# Patient Record
Sex: FEMALE
[De-identification: names, ages, dates, MRNs, and addresses within clinical notes are randomized per-mention and may not be internally consistent; named-entity substitution may affect disease eponyms.]

---

## 2021-04-16 PROBLEM — Z00.00 ENCOUNTER FOR PREVENTIVE HEALTH EXAMINATION: Status: ACTIVE | Noted: 2021-04-16

## 2021-04-20 ENCOUNTER — APPOINTMENT (OUTPATIENT)
Dept: NEUROSURGERY | Facility: CLINIC | Age: 76
End: 2021-04-20
Payer: MEDICARE

## 2021-04-20 VITALS
TEMPERATURE: 98.3 F | BODY MASS INDEX: 39.34 KG/M2 | SYSTOLIC BLOOD PRESSURE: 209 MMHG | OXYGEN SATURATION: 97 % | HEART RATE: 89 BPM | HEIGHT: 55 IN | WEIGHT: 170 LBS | RESPIRATION RATE: 17 BRPM | DIASTOLIC BLOOD PRESSURE: 87 MMHG

## 2021-04-20 DIAGNOSIS — I10 ESSENTIAL (PRIMARY) HYPERTENSION: ICD-10-CM

## 2021-04-20 DIAGNOSIS — J45.909 UNSPECIFIED ASTHMA, UNCOMPLICATED: ICD-10-CM

## 2021-04-20 DIAGNOSIS — G95.9 DISEASE OF SPINAL CORD, UNSPECIFIED: ICD-10-CM

## 2021-04-20 DIAGNOSIS — E11.9 TYPE 2 DIABETES MELLITUS W/OUT COMPLICATIONS: ICD-10-CM

## 2021-04-20 DIAGNOSIS — Z78.9 OTHER SPECIFIED HEALTH STATUS: ICD-10-CM

## 2021-04-20 PROCEDURE — 99205 OFFICE O/P NEW HI 60 MIN: CPT | Mod: 57

## 2021-04-20 NOTE — CONSULT LETTER
[Dear  ___] : Dear  [unfilled], [Consult Letter:] : I had the pleasure of evaluating your patient, [unfilled]. [Please see my note below.] : Please see my note below. [Consult Closing:] : Thank you very much for allowing me to participate in the care of this patient.  If you have any questions, please do not hesitate to contact me. [Sincerely,] : Sincerely, [FreeTextEntry2] : Patrick Mujica MD\par [address]\[place] [address]\par  [place] [phone] [FreeTextEntry3] : El Henao MD, FACS, FAANS\par Professor and \par Department of Neurosurgery\par [facility]\par [address] [place] [address], [address] [place] [address]\[place] [address]\par [phone] Clinical\par [phone] Academic\par

## 2021-04-20 NOTE — DATA REVIEWED
[de-identified] : MRI cervical spine 3/26/21 shows classical multilevel cervical spondylosis with severe canal stenosis at C3-4, 4-5, 5-6 and less at C6-7. 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

## 2021-04-20 NOTE — HISTORY OF PRESENT ILLNESS
[Other: ___] : [unfilled] [FreeTextEntry1] : Weakness of the arms and legs worse on left, left arm pain and low back pain with bilateral leg radiation since October 2020 [de-identified] : \par Leni Huerta is a 76 year old RH lady of [place] origin, who is here for consultation regarding leg weakness and left arm pain. In the year 2006 pt experienced  knee pain and leg weakness and was wheel chair bound. She was treated with nerve blocks and trigger point injections. Her leg weakness got better over 2 months with therapy and she progressed from walker to cane and eventually was able to walk without any support. She was doing well until in October 2020, when she again started to experience leg pains and weakness in both legs. The pain and weakness started suddenly, she fell and has not been able to walk after that.  Her pain was managed by Patrick Mujica MD, who referred her here for evaluation of myelomalacia C3- C5 on a cervical MRI. \par \par  Today she presents to the clinic in a wheel chair with c/o inability to walk and low back pain which radiates down both legs left more than right. She also reports occasional neck pain and left arm pain. The  pain in the back and legs is sharp and can go up to 10/10 and temporarily relieved by Tylenol. She feels weakness in both arms and legs, is unable to walk  and has been wheel chair bound since October 2020. Her main problem is the weakness in her legs. She has no acute bladder or bowel incontinence or anesthesia. \par \par Pain management: Patrick Mujica MD, [address] [place] [address] : Phone- [phone]\par  \par \par  \par

## 2021-04-20 NOTE — PHYSICAL EXAM
[General Appearance - Alert] : alert [General Appearance - In No Acute Distress] : in no acute distress [General Appearance - Well Nourished] : well nourished [General Appearance - Well-Appearing] : healthy appearing [Oriented To Time, Place, And Person] : oriented to person, place, and time [Affect] : the affect was normal [Person] : oriented to person [Place] : oriented to place [Time] : oriented to time [Cranial Nerves Optic (II)] : visual acuity intact bilaterally,  pupils equal round and reactive to light [Cranial Nerves Oculomotor (III)] : extraocular motion intact [Cranial Nerves Trigeminal (V)] : facial sensation intact symmetrically [Cranial Nerves Facial (VII)] : face symmetrical [Cranial Nerves Vestibulocochlear (VIII)] : hearing was intact bilaterally [Cranial Nerves Accessory (XI - Cranial And Spinal)] : head turning and shoulder shrug symmetric [5] : C5 Biceps 5/5 [2] : C7 extensor digitorum longus 2/5 [3] : S1 flexor hallucis longus 3/5 [Jameson] : Jameson's sign was demonstrated [Sclera] : the sclera and conjunctiva were normal [PERRL With Normal Accommodation] : pupils were equal in size, round, reactive to light, with normal accommodation [Outer Ear] : the ears and nose were normal in appearance [Neck Appearance] : the appearance of the neck was normal [] : the neck was supple [Respiration, Rhythm And Depth] : normal respiratory rhythm and effort [Apical Impulse] : the apical impulse was normal [Heart Rate And Rhythm] : heart rate was normal and rhythm regular [Arterial Pulses Carotid] : carotid pulses were normal with no bruits [Abdominal Aorta] : the abdominal aorta was normal [No CVA Tenderness] : no ~M costovertebral angle tenderness [Involuntary Movements] : no involuntary movements were seen [Cranial Nerves Glossopharyngeal (IX)] : tongue and palate midline [Cranial Nerves Hypoglossal (XII)] : there was no tongue deviation with protrusion [Quadriparesis] : quadriparesis affecting all four limbs [4] : L2 Iliopsoas 4/5 [Sensation Tactile Decrease] : light touch was intact [Sensation Pain / Temperature Decrease] : pain and temperature was intact [3+] : Triceps left 3+ [2+] : Brachioradialis left 2+ [FreeTextEntry1] : Tuvaluan speaking.  [FreeTextEntry6] : Wheelchair bound. Cannot stand on her own. [FreeTextEntry8] : unable to walk [de-identified] : Left Jameson

## 2021-04-20 NOTE — ASSESSMENT
[FreeTextEntry1] : IMPRESSION:\par 1. Severe cervical canal stenosis C3-7 with myelomalacia C3-6\par 2. Functional quadriparesis\par 3. Myelopathy and radiculopathies\par 4. Likely has lumbar canal stenosis with radiculopathies - MRI pending\par 5. Diabetes\par 6. Hypertension\par 7. Asthma\par \par \par PLAN:\par 1: Surgery is the only solution for her problem. She will need C 3-7 laminectomies and fusion. However, given that she has been unable to walk for 6 months and has myelomalacia, no assurances can be given regarding the extent of recovery. She may also need a lumbar decompression - MRI pending.\par 2: However, the patient is definitely against any surgery at this time\par 3: Her daughter will discuss the surgery with family members and her providers and will call us with her decision.\par \par El Henao MD, FACS, FAANS\par Professor and \par Department of Neurosurgery\par [facility]\par [address], [address]\[place] [address]\par [phone] Clinical\par [phone] Academic\par \par

## 2021-04-20 NOTE — REVIEW OF SYSTEMS
[Leg Weakness] : leg weakness [Difficulty Walking] : difficulty walking [Inability to Walk] : inability to walk [Negative] : Heme/Lymph

## 2021-04-20 NOTE — REASON FOR VISIT
[Referred By: _________] : Patient was referred by CARLOS [Family Member] : family member [Consultation] : a consultation visit [FreeTextEntry1] : Weakness of the arms and legs, left arm pain and low back pain with leg radiation since October 2020